# Patient Record
(demographics unavailable — no encounter records)

---

## 2024-10-19 NOTE — PROCEDURE
[FreeTextEntry1] :  Pathology             Final  No Documents Attached    	 [facility] Accession Number : [id number] Patient:     MARY LOU HUTCHINSON   Accession:                             [id number]  Collected Date/Time:                   10/2/2024 08:45 EDT Received Date/Time:                    10/4/2024 11:29 EDT  Surgical Pathology Report - Auth (Verified)  Specimen(s) Submitted 1-Endo-Bronchial biopsy  Final Diagnosis Endo-Bronchial biopsy: -   Airway wall with focal area of edema and bronchial gland hyperplasia.  No granulomas or malignancy seen.  Verified by: Thomas Ko MD (Electronic Signature) Reported on: 10/09/24 16:51 EDT, [facility] [place] [address] Phone: [phone]   Fax: [phone] _________________________________________________________________  Clinical Information  lung nodule  Gross Description Received in formalin labeled   "endobronchial biopsy"  are four, tan-pink, soft tissue fragments each measuring 0.1 cm in greatest dimension. Entirely in one cassette: 1A.  Jasen FATIMA ([facility]) 10/04/2024 06:12 PM  Disclaimer In addition to other data that may appear on the specimen containers, all labels have been inspected to confirm the presence of the patient's name and date of birth.  .  Ordered by: ANDREWS RIVERA       Collected/Examined: 02Oct2024 08:45AM       Verified by: ANDREWS RIVERA 09Oct2024 07:25PM       Result Communication: No patient communication needed at this time; Stage: Final       Performed at: [facility]       Resulted: 09Oct2024 04:51PM       Last Updated: 09Oct2024 07:25PM       Accession: [id number]

## 2024-10-19 NOTE — ASSESSMENT
[FreeTextEntry1] : Reviewed surgical pathology of bronchoscopy biopsy result with patient in the office today, which is nondiagnostic.   Will refer patient to Dr. Romero for ultrasound-guided biopsy of inguinal lymph node for definitive tissue diagnosis. Venipuncture with labs drawn in office. Reviewed PET scan results with patient in the office today. Return for pulm follow-up 1 week after ultrasound guided biopsy is performed.

## 2024-10-19 NOTE — PROCEDURE
[FreeTextEntry1] :  Pathology             Final  No Documents Attached    	 [facility] Accession Number : [id number] Patient:     MARY LOU HUTCHINSON   Accession:                             [id number]  Collected Date/Time:                   10/2/2024 08:45 EDT Received Date/Time:                    10/4/2024 11:29 EDT  Surgical Pathology Report - Auth (Verified)  Specimen(s) Submitted 1-Endo-Bronchial biopsy  Final Diagnosis Endo-Bronchial biopsy: -   Airway wall with focal area of edema and bronchial gland hyperplasia.  No granulomas or malignancy seen.  Verified by: Thomas Ko MD (Electronic Signature) Reported on: 10/09/24 16:51 EDT, [facility] [place] [address] Phone: [phone]   Fax: [phone] _________________________________________________________________  Clinical Information  lung nodule  Gross Description Received in formalin labeled   "endobronchial biopsy"  are four, tan-pink, soft tissue fragments each measuring 0.1 cm in greatest dimension. Entirely in one cassette: 1A.  Jasen FATIMA ([facility]) 10/04/2024 06:12 PM  Disclaimer In addition to other data that may appear on the specimen containers, all labels have been inspected to confirm the presence of the patient's name and date of birth.  .  Ordered by: ANDREWS RIVERA       Collected/Examined: 02Oct2024 08:45AM       Verified by: ANDREWS RIVERA 09Oct2024 07:25PM       Result Communication: No patient communication needed at this time; Stage: Final       Performed at: [facility]       Resulted: 09Oct2024 04:51PM       Last Updated: 09Oct2024 07:25PM       Accession: [id number]

## 2024-11-25 NOTE — CONSULT LETTER
[Dear  ___] : Dear  [unfilled], [Courtesy Letter:] : I had the pleasure of seeing your patient, [unfilled], in my office today. [Please see my note below.] : Please see my note below. [Consult Closing:] : Thank you very much for allowing me to participate in the care of this patient.  If you have any questions, please do not hesitate to contact me. [Sincerely,] : Sincerely, [FreeTextEntry3] : Dr. Kathryn Curtis

## 2024-11-25 NOTE — ASSESSMENT
[FreeTextEntry1] : Dr. Curtis reviewed with Iain his pathology report in office today. Pathology was negative for carcinoma.   Venipuncture with labs drawn in office. Although pathology was negative for carcinoma, given the widespread nature of Iain's nodules, he requires vigilant, continued montoring for malignancy. Therefore Iain should conduct a repeat chest CT scan with contrast to montior his lymph nodes. If indicated, a further biopsy may be recommended.   Return for pulmonary follow-up 2 weeks after chest CT scan is performed.

## 2024-11-25 NOTE — PROCEDURE
[FreeTextEntry1] : Pathology             Final  No Documents Attached    	 [facility] Accession Number : [id number] Patient:     MARY LOU HUTCHINSON   Accession:                             [id number]  Collected Date/Time:                   11/13/2024 11:37 EST Received Date/Time:                    11/13/2024 15:27 EST  Fine Needle Aspiration Report - Auth (Verified)  Specimen(s) Submitted LYMPH NODE, RIGHT, INGUINAL, FNA  Final Diagnosis LYMPH NODE, RIGHT, INGUINAL, FNA NEGATIVE FOR CARCINOMA.  Cytology slides show predominantly small to medium sized lymphocytes. No Jonathan Natalie cells, granulomas or abnormal epithelial cells are  identified. Cell block is noncontributory. Recommend tissue examination with fresh specimen sent to flow cytometry to evaluate lymphocyte clonality if lymphadenopathy persists and if clinically indicated.  Please also see Flow Cytometry report:   [id number] . Screened by: Miko REDDING(ASCP) Verified by: Vy Han (Electronic Signature) Reported on: 11/18/24 20:31 EST, [employer] [facility] [place] [address] Phone: [phone]   Fax: [phone] Cytology processed at [facility], [address] [place] [address] _________________________________________________________________   Statement of Adequacy Immediate cytologic study for adequacy of specimen using a Diff-Quik stain was performed at [facility], [address] [place] [address]. FNA deemed acceptable for further study by the cytotechnologist.  Clinical Information R59.0 Hx mixed dust disease  Gross Description Received: Needle rinses  in formalin. 30 ml of brown fluid. 6 Smear received ( 3 air dried and 3 fixed in alcohol) Prepared:  1 cell block  Ordered by: CHASE GUPTA       Collected/Examined: 13Nov2024 11:37AM       Verified by: CHASE GUPTA 24Nov2024 10:04PM       Result Communication: No patient communication needed at this time; Stage: Final       Performed at: [facility]       Resulted: [id number] 08:31PM       Last Updated: [id number] 10:04PM       Accession: [id number]

## 2024-11-25 NOTE — PROCEDURE
[FreeTextEntry1] : Pathology             Final  No Documents Attached    	 [facility] Accession Number : [id number] Patient:     MARY LOU HUTCHINSON   Accession:                             [id number]  Collected Date/Time:                   11/13/2024 11:37 EST Received Date/Time:                    11/13/2024 15:27 EST  Fine Needle Aspiration Report - Auth (Verified)  Specimen(s) Submitted LYMPH NODE, RIGHT, INGUINAL, FNA  Final Diagnosis LYMPH NODE, RIGHT, INGUINAL, FNA NEGATIVE FOR CARCINOMA.  Cytology slides show predominantly small to medium sized lymphocytes. No Jonathan Natalie cells, granulomas or abnormal epithelial cells are  identified. Cell block is noncontributory. Recommend tissue examination with fresh specimen sent to flow cytometry to evaluate lymphocyte clonality if lymphadenopathy persists and if clinically indicated.  Please also see Flow Cytometry report:   [id number] . Screened by: Miko REDDING(ASCP) Verified by: Vy Han (Electronic Signature) Reported on: 11/18/24 20:31 EST, [facility] [place] [address] Phone: [phone]   Fax: [phone] Cytology processed at [facility], [address] [place] [address] _________________________________________________________________   Statement of Adequacy Immediate cytologic study for adequacy of specimen using a Diff-Quik stain was performed at [facility], [address] [place] [address]. FNA deemed acceptable for further study by the cytotechnologist.  Clinical Information R59.0 Hx mixed dust disease  Gross Description Received: Needle rinses  in formalin. 30 ml of brown fluid. 6 Smear received ( 3 air dried and 3 fixed in alcohol) Prepared:  1 cell block  Ordered by: CHASE GUPTA       Collected/Examined: 13Nov2024 11:37AM       Verified by: CHASE GUPTA 24Nov2024 10:04PM       Result Communication: No patient communication needed at this time; Stage: Final       Performed at: [facility]       Resulted: [id number] 08:31PM       Last Updated: [id number] 10:04PM       Accession: [id number]

## 2024-11-25 NOTE — HISTORY OF PRESENT ILLNESS
[TextBox_4] : MARY LOU HUTCHINSON is a 44 year old male, with history of Vitamin D deficiency, who presents to the office for follow up evaluation. Marklise is here to review his pathology report for his recent biopsy.

## 2024-11-25 NOTE — ADDENDUM
[FreeTextEntry1] : I, Primitivo Clinton, acted solely as a scribe for Dr. Kathryn Curtis D.O. on this date 11/25/2024.   All medical record entries made by the Scribe were at my, Dr. Kathryn Curtis D.O., direction and personally dictated by me on 11/25/2024. I have reviewed the chart and agree that the record accurately reflects my personal performance of the history, physical exam, assessment and plan. I have also personally directed, reviewed, and agreed with the chart.